# Patient Record
Sex: FEMALE
[De-identification: names, ages, dates, MRNs, and addresses within clinical notes are randomized per-mention and may not be internally consistent; named-entity substitution may affect disease eponyms.]

---

## 2019-02-04 ENCOUNTER — HOSPITAL ENCOUNTER (EMERGENCY)
Dept: HOSPITAL 31 - C.ER | Age: 64
Discharge: HOME | End: 2019-02-04
Payer: MEDICARE

## 2019-02-04 VITALS
RESPIRATION RATE: 18 BRPM | SYSTOLIC BLOOD PRESSURE: 125 MMHG | OXYGEN SATURATION: 98 % | DIASTOLIC BLOOD PRESSURE: 82 MMHG | TEMPERATURE: 98.2 F | HEART RATE: 70 BPM

## 2019-02-04 VITALS — BODY MASS INDEX: 36.3 KG/M2

## 2019-02-04 DIAGNOSIS — S80.02XA: Primary | ICD-10-CM

## 2019-02-04 DIAGNOSIS — S80.211A: ICD-10-CM

## 2019-02-04 DIAGNOSIS — S80.212A: ICD-10-CM

## 2019-02-04 DIAGNOSIS — S63.502A: ICD-10-CM
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
## 2019-02-04 DIAGNOSIS — S80.01XA: ICD-10-CM

## 2019-02-04 NOTE — C.PDOC
History Of Present Illness


63 year old female presents to the ED for evaluation of left hand and wrist and 

bilateral knee pain which began today. Patient states she was walking to 

physical therapy when she accidentally slipped on a hole on the sidewalk and 

fell forward. She denies head injury, LOC, extremity numbness/weakness. Patient 

states she is UTD with tetanus immunization. 


Time Seen by Provider: 02/04/19 10:06


Chief Complaint (Nursing): Lower Extremity Problem/Injury


History Per: Patient


History/Exam Limitations: no limitations


Onset/Duration Of Symptoms: Hrs


Current Symptoms Are (Timing): Still Present


Additional History Per: Patient





- Knee


Description Of Injury: Fell





Past Medical History


Reviewed: Historical Data, Nursing Documentation, Vital Signs


Vital Signs: 





                                Last Vital Signs











Temp  98.2 F   02/04/19 09:55


 


Pulse  70   02/04/19 09:55


 


Resp  18   02/04/19 09:55


 


BP  125/82   02/04/19 09:55


 


Pulse Ox  98   02/04/19 09:55














- Medical History


PMH: Anxiety, Arthritis, Asthma, Bipolar Disorder, Depression, Diabetes, HTN, 

Osteoporosis, Schizophrenia, Chronic Pain (lower extremity pain)


Surgical History: Appendectomy, Cholecystectomy


Family History: States: Unknown Family Hx





- Social History


Hx Tobacco Use: No


Hx Alcohol Use: No


Hx Substance Use: No





- Immunization History


Hx Tetanus Toxoid Vaccination: Yes


Hx Influenza Vaccination: Yes


Hx Pneumococcal Vaccination: Yes





Review Of Systems


Musculoskeletal: Positive for: Other (bilateral knee, left hand and wrist pain )


Neurological: Negative for: Weakness, Numbness, Other (head injury, LOC )





Physical Exam





- Physical Exam


Appears: Non-toxic, No Acute Distress


Skin: Normal Color, Warm, Dry


Head: Atraumatic, Normacephalic


Eye(s): bilateral: Normal Inspection


Oral Mucosa: Moist


Neck: Supple


Chest: Symmetrical, No Deformity, No Tenderness


Cardiovascular: Rhythm Regular, No Murmur


Respiratory: Normal Breath Sounds, No Rales, No Rhonchi, No Wheezing


Extremity: Tenderness (mild, to left wrist ), Capillary Refill (less than 2 

seconds ), No Deformity, No Swelling, Other (2cm abrasions to bilateral knees 

that are tender to palpation. )


Pulses: Left Radial: Normal, Right Radial: Normal


Neurological/Psych: Oriented x3, Normal Speech, Normal Cognition





ED Course And Treatment


O2 Sat by Pulse Oximetry: 98 (on RA)


Pulse Ox Interpretation: Normal





- Other Rad


  ** knee XR


X-Ray: Viewed By Me, Read By Radiologist


Interpretation: Date of service:  02/04/2019.  PROCEDURE:  Bilateral Knee 

Radiographs.  HISTORY:  B/L KNEE PAIN AFTER FALL.  COMPARISON:  None.  FINDINGS:

 BONES:  Right Knee:  Normal. No fracture.  Left Knee:  Normal. No fracture.  

JOINTS:  Right Knee:  Lateral and patellofemoral osteoarthritis.  No articular 

erosion.  Medial compartment preserved.  Left knee: Lateral and patellofemoral 

osteoarthritis.  No articular erosion.  Medial compartment preserved.  SOFT 

TISSUES:  Right Knee: Normal.  Left Knee: Normal.  JOINT EFFUSION:  Right Knee: 

None.  Left Knee: None.  OTHER FINDINGS:  None.  IMPRESSION:  Bilateral lateral 

and patellofemoral osteoarthritis, most prominent in the lateral compartment.





  ** wrist XR


X-Ray: Viewed By Me, Read By Radiologist


Interpretation: PROCEDURE:  Left Wrist Radiographs.  HISTORY:  left wrist pain 

after fall.  COMPARISON:  None available.  FINDINGS:  BONES:  Osseous 

demineralization limits evaluation for acute fracture lines.  No acute displaced

fracture.  JOINTS:  No dislocation.  SOFT TISSUES:  Soft tissue swelling.  No 

evidence of radiopaque foreign body.  OTHER FINDINGS:  None.  IMPRESSION:  Soft 

tissue swelling.  Osseous demineralization.  No acute displaced fracture, 

dislocation, or significant joint effusion identified.   If symptoms persist, or

if there is continued clinical concern, x-ray follow-up in 7-10 days should be 

considered.


Progress Note: Left wrist and bilateral knee XR ordered and reviewed.  Results 

are unremarkable.  Bacitracin TOP and clean sterile dressing applied to knees by

ED nurse. ACE wrap applied by ED tech.  On reassessment, patient is resting 

comfortably and showing no signs of distress. Patient is refusing further 

intervention at this time and is stable for discharge. Advised to f/u with PMD/ 

orthopedic care for further evaluation.





Disposition


Counseled Patient/Family Regarding: Studies Performed, Diagnosis, Need For 

Followup, Rx Given





- Disposition


Referrals: 


Shaik Szymanski MD [Staff Provider] - 


Raman Zambrano MD [Staff Provider] - 


Disposition: HOME/ ROUTINE


Disposition Time: 11:15


Condition: STABLE


Additional Instructions: 


FOLLOW UP WITH ORTHOPEDICS WITHIN 1 WEEK IF SYMPTOMS PERSIST





USE IBUPROFEN OR TYLENOL FOR PAIN AS NEEDED





RETURN TO ER IF SYMPTOMS WORSEN 


Instructions:  Wrist Sprain (DC), Knee Sprain (DC)


Forms:  [employer] (English)


Print Language: ENGLISH





- POA


Present On Arrival: Falls Or Trauma





- Clinical Impression


Clinical Impression: 


 Abrasion of knee, bilateral, Knee contusion, Left wrist sprain








- Scribe Statement


The provider has reviewed the documentation as recorded by the Scribe (Bobbi Ramirez)


Provider Attestation: 








All medical record entries made by the Scribe were at my direction and 

personally dictated by me. I have reviewed the chart and agree that the record 

accurately reflects my personal performance of the history, physical exam, 

medical decision making, and the department course for this patient. I have also

personally directed, reviewed, and agree with the discharge instructions and 

disposition.

## 2019-02-04 NOTE — RAD
PROCEDURE:  Left Wrist Radiographs.   



HISTORY:

left wrist pain after fall



COMPARISON:

None available.



FINDINGS:



BONES:

Osseous demineralization limits evaluation for acute fracture lines.  

No acute displaced fracture. 



JOINTS:

No dislocation. 



SOFT TISSUES:

Soft tissue swelling.  No evidence of radiopaque foreign body 



OTHER FINDINGS:

None.



IMPRESSION:

Soft tissue swelling.  



Osseous demineralization. 



No acute displaced fracture, dislocation, or significant joint 

effusion identified.   If symptoms persist, or if there is continued 

clinical concern, x-ray follow-up in 7-10 days should be considered.

## 2019-02-04 NOTE — RAD
Date of service: 



02/04/2019



PROCEDURE:  Bilateral Knee Radiographs.



HISTORY:

B/L KNEE PAIN AFTER FALL



COMPARISON:

None.



FINDINGS:



BONES:

Right Knee:  Normal. No fracture. 



Left Knee:  Normal. No fracture. 



JOINTS:

Right Knee:  Lateral and patellofemoral osteoarthritis.  No articular 

erosion.  Medial compartment preserved. 



Left knee: Lateral and patellofemoral osteoarthritis.  No articular 

erosion.  Medial compartment preserved. 



SOFT TISSUES:

Right Knee: Normal.



Left Knee: Normal.



JOINT EFFUSION:

Right Knee: None. 



Left Knee: None.



OTHER FINDINGS:

None.



IMPRESSION:

Bilateral lateral and patellofemoral osteoarthritis, most prominent 

in the lateral compartment.